# Patient Record
(demographics unavailable — no encounter records)

---

## 2025-07-10 NOTE — REVIEW OF SYSTEMS
[SOB] : shortness of breath [Negative] : Heme/Lymph [Palpitations] : palpitations [Dizziness] : dizziness

## 2025-07-10 NOTE — ASSESSMENT
[FreeTextEntry1] : 23 y/o F with no PMH presents for evaluation for shortness of breath/ palpitations with near syncope   1) palpitations with near syncope, dizziness lightheadedness - Patient notes that he occasionally feels palpitations which is been followed by feeling dizzy lightheaded and on 1 or 2 occasions previously had near syncopal events - EKG reviewed shows normal sinus rhythm at 69 bpm with RSR' which may be normal for age;  EKG was obtained to assist in the diagnosed and management of assessed problem(s) - Overall on physical exam no evidence of any murmurs and overall appears stable - Blood pressure controlled - Will refer for TTE to assess LV function and any significant valvular issues - Will refer for Holter monitor for 1 week as patient notes that he gets symptoms once or twice every 2 weeks - Sent for blood work to check electrolytes and thyroid-CBC, TSH, CMP, and magnesium - Discussed hydration - Will need to obtain records from previous pediatrician  Aida Brooks D.O. [facility]/Vascular Cardiology -[facility] Telephone # [phone]

## 2025-07-10 NOTE — REASON FOR VISIT
[Symptom and Test Evaluation] : symptom and test evaluation [FreeTextEntry1] : 23 y/o F with no PMH presents for evaluation for shortness of breath/ palpitations with near syncope   Feeling tired and fatigued chest tightness no pain, breath in more than a normal breath would feel it; feels like he is about to pass out.  Feels palpitations, once every two weeks, lasting for a couple of seconds Not necessarily when he exercises he feels well and the episodes of random In 2020 had an EKG, had previous efforts and passed out, wasn't no unconscious   Dr. [facility]  Occasionally feels shortness of breath with sleeping  Caffeine intake   Smoking Hx: in college vaped for 1 yr Family Hx: no heart disease  GF: Defbrillator, no CAD or CABG